# Patient Record
(demographics unavailable — no encounter records)

---

## 2024-11-07 NOTE — END OF VISIT
[Time Spent: ___ minutes] : I have spent [unfilled] minutes of time on the encounter which excludes teaching and separately reported services. [FreeTextEntry3] :  I, Patrick Abernathy, am scribing for and in the presence of Dr. Stephanie Doan in the following sections: HISTORY OF PRESENT ILLNESS; REVIEW OF SYSTEMS; PHYSICAL EXAM; ASSESSMENT/ PLAN.I, Stephanie Doan, personally performed the services described in the documentation, reviewed the documentation recorded by the scribe in my presence, and it accurately and completely records my words and actions. 11/07/24
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
## 2024-11-07 NOTE — PHYSICAL EXAM
[Alert] : alert [Well Nourished] : well nourished [No Acute Distress] : no acute distress [Well Developed] : well developed [Normal Sclera/Conjunctiva] : normal sclera/conjunctiva [EOMI] : extra ocular movement intact [No Proptosis] : no proptosis [Normal Oropharynx] : the oropharynx was normal [No Thyroid Nodules] : no palpable thyroid nodules [No Respiratory Distress] : no respiratory distress [No Accessory Muscle Use] : no accessory muscle use [Normal S1, S2] : normal S1 and S2 [Normal Rate] : heart rate was normal [Regular Rhythm] : with a regular rhythm [No Edema] : no peripheral edema [Pedal Pulses Normal] : the pedal pulses are present [Normal Bowel Sounds] : normal bowel sounds [Not Tender] : non-tender [Not Distended] : not distended [Soft] : abdomen soft [Normal Anterior Cervical Nodes] : no anterior cervical lymphadenopathy [No Spinal Tenderness] : no spinal tenderness [Spine Straight] : spine straight [No Stigmata of Cushings Syndrome] : no stigmata of Cushings Syndrome [Normal Gait] : normal gait [Normal Strength/Tone] : muscle strength and tone were normal [No Rash] : no rash [Normal Reflexes] : deep tendon reflexes were 2+ and symmetric [No Tremors] : no tremors [Oriented x3] : oriented to person, place, and time [Acanthosis Nigricans] : no acanthosis nigricans [de-identified] : mildly enlarged thyroid on exam  [de-identified] : mild wheezing left front  [de-identified] : 2/6 MAIDA

## 2024-11-07 NOTE — REASON FOR VISIT
[Follow - Up] : a follow-up visit [DM Type 1] : DM Type 1 [Insulin Pump] : insulin pump management [Thyroid nodule/ MNG] : thyroid nodule/ MNG [Weight Management/Obesity] : weight management/obesity [Other___] : [unfilled]

## 2024-11-07 NOTE — PHYSICAL EXAM
[Alert] : alert [Well Nourished] : well nourished [No Acute Distress] : no acute distress [Well Developed] : well developed [Normal Sclera/Conjunctiva] : normal sclera/conjunctiva [EOMI] : extra ocular movement intact [No Proptosis] : no proptosis [Normal Oropharynx] : the oropharynx was normal [No Thyroid Nodules] : no palpable thyroid nodules [No Respiratory Distress] : no respiratory distress [No Accessory Muscle Use] : no accessory muscle use [Normal S1, S2] : normal S1 and S2 [Normal Rate] : heart rate was normal [Regular Rhythm] : with a regular rhythm [No Edema] : no peripheral edema [Pedal Pulses Normal] : the pedal pulses are present [Normal Bowel Sounds] : normal bowel sounds [Not Tender] : non-tender [Not Distended] : not distended [Soft] : abdomen soft [Normal Anterior Cervical Nodes] : no anterior cervical lymphadenopathy [No Spinal Tenderness] : no spinal tenderness [Spine Straight] : spine straight [No Stigmata of Cushings Syndrome] : no stigmata of Cushings Syndrome [Normal Gait] : normal gait [Normal Strength/Tone] : muscle strength and tone were normal [No Rash] : no rash [Normal Reflexes] : deep tendon reflexes were 2+ and symmetric [No Tremors] : no tremors [Oriented x3] : oriented to person, place, and time [Acanthosis Nigricans] : no acanthosis nigricans [de-identified] : mildly enlarged thyroid on exam  [de-identified] : mild wheezing left front  [de-identified] : 2/6 MAIDA

## 2025-05-09 NOTE — PHYSICAL EXAM
[Alert] : alert [Well Nourished] : well nourished [No Acute Distress] : no acute distress [Well Developed] : well developed [Normal Sclera/Conjunctiva] : normal sclera/conjunctiva [EOMI] : extra ocular movement intact [No Proptosis] : no proptosis [Normal Oropharynx] : the oropharynx was normal [No Thyroid Nodules] : no palpable thyroid nodules [No Respiratory Distress] : no respiratory distress [No Accessory Muscle Use] : no accessory muscle use [Normal S1, S2] : normal S1 and S2 [Normal Rate] : heart rate was normal [Regular Rhythm] : with a regular rhythm [No Edema] : no peripheral edema [Pedal Pulses Normal] : the pedal pulses are present [Normal Bowel Sounds] : normal bowel sounds [Not Tender] : non-tender [Not Distended] : not distended [Soft] : abdomen soft [Normal Anterior Cervical Nodes] : no anterior cervical lymphadenopathy [No Spinal Tenderness] : no spinal tenderness [Spine Straight] : spine straight [No Stigmata of Cushings Syndrome] : no stigmata of Cushings Syndrome [Normal Gait] : normal gait [Normal Strength/Tone] : muscle strength and tone were normal [No Rash] : no rash [Normal Reflexes] : deep tendon reflexes were 2+ and symmetric [No Tremors] : no tremors [Oriented x3] : oriented to person, place, and time [Acanthosis Nigricans] : no acanthosis nigricans [de-identified] : mildly enlarged thyroid on exam  [de-identified] : mild wheezing left front  [de-identified] : 2/6 MAIDA
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
## 2025-05-09 NOTE — END OF VISIT
[Time Spent: ___ minutes] : I have spent [unfilled] minutes of time on the encounter which excludes teaching and separately reported services. [FreeTextEntry3] :  I, Patrick Abernathy, am scribing for and in the presence of Dr. Stepahnie Doan in the following sections: HISTORY OF PRESENT ILLNESS; REVIEW OF SYSTEMS; PHYSICAL EXAM; ASSESSMENT/ PLAN.I, Stephanie Doan, personally performed the services described in the documentation, reviewed the documentation recorded by the scribe in my presence, and it accurately and completely records my words and actions. 05/09/25